# Patient Record
Sex: FEMALE | URBAN - METROPOLITAN AREA
[De-identification: names, ages, dates, MRNs, and addresses within clinical notes are randomized per-mention and may not be internally consistent; named-entity substitution may affect disease eponyms.]

---

## 2024-09-05 ENCOUNTER — TELEPHONE ENCOUNTER (OUTPATIENT)
Dept: URBAN - METROPOLITAN AREA CLINIC 115 | Facility: CLINIC | Age: 44
End: 2024-09-05

## 2024-09-09 ENCOUNTER — OFFICE VISIT (OUTPATIENT)
Dept: URBAN - METROPOLITAN AREA CLINIC 25 | Facility: CLINIC | Age: 44
End: 2024-09-09

## 2024-09-09 RX ORDER — VELPATASVIR AND SOFOSBUVIR 100; 400 MG/1; MG/1
1 TABLET TABLET, FILM COATED ORAL
Qty: 90 | Status: ACTIVE | COMMUNITY
Start: 2024-08-28 | End: 2024-11-25

## 2024-09-09 RX ORDER — VELPATASVIR AND SOFOSBUVIR 100; 400 MG/1; MG/1
1 TABLET TABLET, FILM COATED ORAL
Qty: 90 | OUTPATIENT

## 2024-09-09 NOTE — HPI-TODAY'S VISIT:
September 2024 visit: Labs from July 2024 showed no ova parasites.  Epclusa was approved by pharmacy.  Labs from August 2024 revealed mildly elevated AST 57, ALT 54, otherwise normal LFTs, no anemia, normal platelet count.

## 2024-09-09 NOTE — HPI-OTHER HISTORIES
08/24 OV  No concomitant hep B infection w/ Hep C, Hep C genotype 4, w/ HCV RNA above 4 million UI/mL, liver enzymes slightly elevated w/ AST of 48 and ALT of 44, negative O&P. Patient deneis jaundice, icterus, fatigue, change in bowels, no BRBPR, melena, unintentional weight loss  06/24 OV  Ms. Madrid is a 44y F, she presents to the clinic via referral from Dr. Elias for evaluation of Hep C Ab positive labs, as well as possible parasitic infection prophylaxis, no labs or OV notes on file, the patient recently immigrated to the  from [facility] on January 19th 2024, the patient did not receive prophylactic antiparasitic treatment. Patient reports increased fatigue lately. The patient reports she has not been having any issues w/ her bowels lately, and denies rectal itching, general pruritus, abdominal pain, no BRBPR, melena, jaundice, icterus, etoh use, IVDA, needling or recent peircings

## 2024-09-26 ENCOUNTER — OFFICE VISIT (OUTPATIENT)
Dept: URBAN - METROPOLITAN AREA CLINIC 25 | Facility: CLINIC | Age: 44
End: 2024-09-26
Payer: COMMERCIAL

## 2024-09-26 ENCOUNTER — LAB OUTSIDE AN ENCOUNTER (OUTPATIENT)
Dept: URBAN - METROPOLITAN AREA CLINIC 25 | Facility: CLINIC | Age: 44
End: 2024-09-26

## 2024-09-26 ENCOUNTER — TELEPHONE ENCOUNTER (OUTPATIENT)
Dept: URBAN - METROPOLITAN AREA CLINIC 25 | Facility: CLINIC | Age: 44
End: 2024-09-26

## 2024-09-26 DIAGNOSIS — B18.2: ICD-10-CM

## 2024-09-26 PROCEDURE — 99213 OFFICE O/P EST LOW 20 MIN: CPT

## 2024-09-26 RX ORDER — VELPATASVIR AND SOFOSBUVIR 100; 400 MG/1; MG/1
1 TABLET TABLET, FILM COATED ORAL
Qty: 90 | Status: ON HOLD | COMMUNITY

## 2024-09-26 RX ORDER — VELPATASVIR AND SOFOSBUVIR 100; 400 MG/1; MG/1
1 TABLET TABLET, FILM COATED ORAL ONCE A DAY
Qty: 84 TABLET | Refills: 0 | OUTPATIENT
Start: 2024-09-26 | End: 2024-12-18

## 2024-09-26 NOTE — HPI-OTHER HISTORIES
08/24 OV  No concomitant hep B infection w/ Hep C, Hep C genotype 4, w/ HCV RNA above 4 million UI/mL, liver enzymes slightly elevated w/ AST of 48 and ALT of 44, negative O&P. Patient deneis jaundice, icterus, fatigue, change in bowels, no BRBPR, melena, unintentional weight loss  06/24 OV  Ms. Madrid is a 44y F, she presents to the clinic via referral from Dr. Elias for evaluation of Hep C Ab positive labs, as well as possible parasitic infection prophylaxis, no labs or OV notes on file, the patient recently immigrated to the  from [facility] on January 19th 2024, the patient did not receive prophylactic antiparasitic treatment. Patient reports increased fatigue lately. The patient reports she has not been having any issues w/ her bowels lately, and denies rectal itching, general pruritus, abdominal pain, no BRBPR, melena, jaundice, icterus, etoh use, IVDA, needling or recent peircings

## 2024-09-26 NOTE — HPI-TODAY'S VISIT:
September 2024 visit: Labs from July 2024 showed no ova parasites.  Epclusa was approved by pharmacy.  Labs from August 2024 revealed mildly elevated AST 57, ALT 54, otherwise normal LFTs, no anemia, and normal platelet count. Patient has not started antiviral tx yet, as sis is restricted to fill at [facility] rx which does not accept rx transfers and the patient is requesting we send rx to her pharmacy. No issues since last visit, deneis jaundice, icterus, N/V, no change in bowel habits or unintentional weight loss.

## 2024-09-30 LAB
HCV RNA, QUANTITATIVE: (no result)
HCV RNA, QUANTITATIVE: 6.31

## 2024-11-15 ENCOUNTER — TELEPHONE ENCOUNTER (OUTPATIENT)
Dept: URBAN - METROPOLITAN AREA CLINIC 25 | Facility: CLINIC | Age: 44
End: 2024-11-15

## 2024-11-15 ENCOUNTER — OFFICE VISIT (OUTPATIENT)
Dept: URBAN - METROPOLITAN AREA CLINIC 25 | Facility: CLINIC | Age: 44
End: 2024-11-15
Payer: COMMERCIAL

## 2024-11-15 VITALS
HEIGHT: 62 IN | SYSTOLIC BLOOD PRESSURE: 119 MMHG | HEART RATE: 80 BPM | TEMPERATURE: 97.9 F | WEIGHT: 147 LBS | BODY MASS INDEX: 27.05 KG/M2 | DIASTOLIC BLOOD PRESSURE: 80 MMHG

## 2024-11-15 DIAGNOSIS — B18.2: ICD-10-CM

## 2024-11-15 PROCEDURE — 99213 OFFICE O/P EST LOW 20 MIN: CPT

## 2024-11-15 RX ORDER — VELPATASVIR AND SOFOSBUVIR 100; 400 MG/1; MG/1
1 TABLET TABLET, FILM COATED ORAL ONCE A DAY
Qty: 84 TABLET | Refills: 0 | Status: ON HOLD | COMMUNITY
Start: 2024-09-26 | End: 2024-12-18

## 2024-11-15 RX ORDER — VELPATASVIR AND SOFOSBUVIR 100; 400 MG/1; MG/1
1 TABLET TABLET, FILM COATED ORAL
Qty: 90 | Status: ON HOLD | COMMUNITY

## 2024-11-15 NOTE — HPI-OTHER HISTORIES
September 2024 visit: Labs from July 2024 showed no ova parasites. Epclusa was approved by pharmacy. Labs from August 2024 revealed mildly elevated AST 57, ALT 54, otherwise normal LFTs, no anemia, and normal platelet count. Patient has not started antiviral tx yet, as sis is restricted to fill at [facility] rx which does not accept rx transfers and the patient is requesting we send rx to her pharmacy. No issues since last visit, deneis jaundice, icterus, N/V, no change in bowel habits or unintentional weight loss.   08/24 OV  No concomitant hep B infection w/ Hep C, Hep C genotype 4, w/ HCV RNA above 4 million UI/mL, liver enzymes slightly elevated w/ AST of 48 and ALT of 44, negative O&P. Patient deneis jaundice, icterus, fatigue, change in bowels, no BRBPR, melena, unintentional weight loss  06/24 OV  Ms. Madrid is a 44y F, she presents to the clinic via referral from Dr. Elias for evaluation of Hep C Ab positive labs, as well as possible parasitic infection prophylaxis, no labs or OV notes on file, the patient recently immigrated to the  from [facility] on January 19th 2024, the patient did not receive prophylactic antiparasitic treatment. Patient reports increased fatigue lately. The patient reports she has not been having any issues w/ her bowels lately, and denies rectal itching, general pruritus, abdominal pain, no BRBPR, melena, jaundice, icterus, etoh use, IVDA, needling or recent peircings

## 2024-11-15 NOTE — HPI-TODAY'S VISIT:
11/24 OV HCV RNA levels remained elevated from 09/24, but decreased from August labs from 4 million to 2 million UI/ml, CBC/CMP labs are incomplete. The patient has not started epclusa rx, rx was sent twice to [facility], patient has a letter with her today indicating that [facility] has been trying to send the Rx to them but unable to reach the patient, patient reports she finally spoke to them and was supposed to receive the rx yesterday.

## 2024-12-04 ENCOUNTER — OFFICE VISIT (OUTPATIENT)
Dept: URBAN - METROPOLITAN AREA CLINIC 25 | Facility: CLINIC | Age: 44
End: 2024-12-04

## 2025-01-03 ENCOUNTER — OFFICE VISIT (OUTPATIENT)
Dept: URBAN - METROPOLITAN AREA CLINIC 25 | Facility: CLINIC | Age: 45
End: 2025-01-03
Payer: COMMERCIAL

## 2025-01-03 ENCOUNTER — TELEPHONE ENCOUNTER (OUTPATIENT)
Dept: URBAN - METROPOLITAN AREA CLINIC 25 | Facility: CLINIC | Age: 45
End: 2025-01-03

## 2025-01-03 VITALS
HEART RATE: 64 BPM | DIASTOLIC BLOOD PRESSURE: 83 MMHG | TEMPERATURE: 97.9 F | HEIGHT: 62 IN | SYSTOLIC BLOOD PRESSURE: 117 MMHG | BODY MASS INDEX: 27.12 KG/M2 | WEIGHT: 147.4 LBS

## 2025-01-03 DIAGNOSIS — B18.2: ICD-10-CM

## 2025-01-03 PROCEDURE — 99214 OFFICE O/P EST MOD 30 MIN: CPT

## 2025-01-03 RX ORDER — VELPATASVIR AND SOFOSBUVIR 100; 400 MG/1; MG/1
1 TABLET TABLET, FILM COATED ORAL
Qty: 90 | Status: ON HOLD | COMMUNITY

## 2025-01-03 NOTE — HPI-TODAY'S VISIT:
01/25 OV The patient had finally received her rx for epclusa, however, she was only sent one 30 day supply on 11/13 and she had completed a one-month supply on 12/14/24, [facility] did not send the patient the full prescription and advised the patient to call for a refill. Patient denies any jaundice, icterus, malaise, BRBPR, melena, unintentional weigth loss

## 2025-01-03 NOTE — HPI-OTHER HISTORIES
11/24 OV HCV RNA levels remained elevated from 09/24, but decreased from August labs from 4 million to 2 million UI/ml, CBC/CMP labs are incomplete. The patient has not started epclusa rx, rx was sent twice to [facility], patient has a letter with her today indicating that [facility] has been trying to send the Rx to them but unable to reach the patient, patient reports she finally spoke to them and was supposed to receive the rx yesterday.  September 2024 visit: Labs from July 2024 showed no ova parasites. Epclusa was approved by pharmacy. Labs from August 2024 revealed mildly elevated AST 57, ALT 54, otherwise normal LFTs, no anemia, and normal platelet count. Patient has not started antiviral tx yet, as sis is restricted to fill at [facility] which does not accept rx transfers and the patient is requesting we send rx to her pharmacy. No issues since last visit, deneis jaundice, icterus, N/V, no change in bowel habits or unintentional weight loss.   08/24 OV  No concomitant hep B infection w/ Hep C, Hep C genotype 4, w/ HCV RNA above 4 million UI/mL, liver enzymes slightly elevated w/ AST of 48 and ALT of 44, negative O&P. Patient deneis jaundice, icterus, fatigue, change in bowels, no BRBPR, melena, unintentional weight loss  06/24 OV  Ms. Madrid is a 44y F, she presents to the clinic via referral from Dr. Elias for evaluation of Hep C Ab positive labs, as well as possible parasitic infection prophylaxis, no labs or OV notes on file, the patient recently immigrated to the  from [facility] on January 19th 2024, the patient did not receive prophylactic antiparasitic treatment. Patient reports increased fatigue lately. The patient reports she has not been having any issues w/ her bowels lately, and denies rectal itching, general pruritus, abdominal pain, no BRBPR, melena, jaundice, icterus, etoh use, IVDA, needling or recent peircings

## 2025-01-08 ENCOUNTER — TELEPHONE ENCOUNTER (OUTPATIENT)
Dept: URBAN - METROPOLITAN AREA CLINIC 25 | Facility: CLINIC | Age: 45
End: 2025-01-08

## 2025-01-09 ENCOUNTER — OFFICE VISIT (OUTPATIENT)
Dept: URBAN - METROPOLITAN AREA CLINIC 25 | Facility: CLINIC | Age: 45
End: 2025-01-09

## 2025-01-17 ENCOUNTER — TELEPHONE ENCOUNTER (OUTPATIENT)
Dept: URBAN - METROPOLITAN AREA CLINIC 25 | Facility: CLINIC | Age: 45
End: 2025-01-17

## 2025-02-14 ENCOUNTER — OFFICE VISIT (OUTPATIENT)
Dept: URBAN - METROPOLITAN AREA CLINIC 25 | Facility: CLINIC | Age: 45
End: 2025-02-14
Payer: MEDICAID

## 2025-02-14 VITALS
HEIGHT: 62 IN | BODY MASS INDEX: 26.87 KG/M2 | HEART RATE: 80 BPM | WEIGHT: 146 LBS | SYSTOLIC BLOOD PRESSURE: 124 MMHG | DIASTOLIC BLOOD PRESSURE: 79 MMHG | TEMPERATURE: 98.1 F

## 2025-02-14 DIAGNOSIS — B18.2: ICD-10-CM

## 2025-02-14 DIAGNOSIS — R76.8 HEPATITIS C ANTIBODY TEST POSITIVE: ICD-10-CM

## 2025-02-14 PROCEDURE — 99213 OFFICE O/P EST LOW 20 MIN: CPT

## 2025-02-14 RX ORDER — VELPATASVIR AND SOFOSBUVIR 100; 400 MG/1; MG/1
1 TABLET TABLET, FILM COATED ORAL
Qty: 90 | Status: DISCONTINUED | COMMUNITY

## 2025-02-14 NOTE — HPI-TODAY'S VISIT:
02/25 OV  Epclusa rx approved again for 12 week's supply, however, repeat HCV RNA labs from 01/25 were undetectable, indicating possible response, repeat HCV RNA labs were incomplete, patient denies any GI symptoms, no BRBPR, melena, unintentional wt loss, abdominal pain, N/V, jaundice/icterus, fatigue

## 2025-02-14 NOTE — HPI-OTHER HISTORIES
01/25 OV The patient had finally received her rx for epclusa, however, she was only sent one 30 day supply on 11/13 and she had completed a one-month supply on 12/14/24, [facility] did not send the patient the full prescription and advised the patient to call for a refill. Patient denies any jaundice, icterus, malaise, BRBPR, melena, unintentional weigth loss  11/24 OV HCV RNA levels remained elevated from 09/24, but decreased from August labs from 4 million to 2 million UI/ml, CBC/CMP labs are incomplete. The patient has not started epclusa rx, rx was sent twice to [facility], patient has a letter with her today indicating that [facility] has been trying to send the Rx to them but unable to reach the patient, patient reports she finally spoke to them and was supposed to receive the rx yesterday.  September 2024 visit: Labs from July 2024 showed no ova parasites. Epclusa was approved by pharmacy. Labs from August 2024 revealed mildly elevated AST 57, ALT 54, otherwise normal LFTs, no anemia, and normal platelet count. Patient has not started antiviral tx yet, as sis is restricted to fill at [facility] rx which does not accept rx transfers and the patient is requesting we send rx to her pharmacy. No issues since last visit, deneis jaundice, icterus, N/V, no change in bowel habits or unintentional weight loss.   08/24 OV  No concomitant hep B infection w/ Hep C, Hep C genotype 4, w/ HCV RNA above 4 million UI/mL, liver enzymes slightly elevated w/ AST of 48 and ALT of 44, negative O&P. Patient deneis jaundice, icterus, fatigue, change in bowels, no BRBPR, melena, unintentional weight loss  06/24 OV  Ms. Madrid is a 44y F, she presents to the clinic via referral from Dr. Elias for evaluation of Hep C Ab positive labs, as well as possible parasitic infection prophylaxis, no labs or OV notes on file, the patient recently immigrated to the  from [facility] on January 19th 2024, the patient did not receive prophylactic antiparasitic treatment. Patient reports increased fatigue lately. The patient reports she has not been having any issues w/ her bowels lately, and denies rectal itching, general pruritus, abdominal pain, no BRBPR, melena, jaundice, icterus, etoh use, IVDA, needling or recent peircings

## 2025-04-21 ENCOUNTER — LAB OUTSIDE AN ENCOUNTER (OUTPATIENT)
Dept: URBAN - METROPOLITAN AREA CLINIC 25 | Facility: CLINIC | Age: 45
End: 2025-04-21

## 2025-04-24 LAB
HCV RNA, QUANTITATIVE: (no result)
HCV RNA, QUANTITATIVE: (no result)

## 2025-05-08 ENCOUNTER — OFFICE VISIT (OUTPATIENT)
Dept: URBAN - METROPOLITAN AREA CLINIC 25 | Facility: CLINIC | Age: 45
End: 2025-05-08
Payer: MEDICAID

## 2025-05-08 DIAGNOSIS — Z86.19 HISTORY OF HEPATITIS C: ICD-10-CM

## 2025-05-08 DIAGNOSIS — Z12.11 COLON CANCER SCREENING: ICD-10-CM

## 2025-05-08 PROCEDURE — 99214 OFFICE O/P EST MOD 30 MIN: CPT

## 2025-05-08 NOTE — HPI-OTHER HISTORIES
02/25 OV  Epclusa rx approved again for 12 week's supply, however, repeat HCV RNA labs from 01/25 were undetectable, indicating possible response, repeat HCV RNA labs were incomplete, patient denies any GI symptoms, no BRBPR, melena, unintentional wt loss, abdominal pain, N/V, jaundice/icterus, fatigue  01/25 OV The patient had finally received her rx for epclusa, however, she was only sent one 30 day supply on 11/13 and she had completed a one-month supply on 12/14/24, [facility] did not send the patient the full prescription and advised the patient to call for a refill. Patient denies any jaundice, icterus, malaise, BRBPR, melena, unintentional weigth loss  11/24 OV HCV RNA levels remained elevated from 09/24, but decreased from August labs from 4 million to 2 million UI/ml, CBC/CMP labs are incomplete. The patient has not started epclusa rx, rx was sent twice to [facility], patient has a letter with her today indicating that [facility] has been trying to send the Rx to them but unable to reach the patient, patient reports she finally spoke to them and was supposed to receive the rx yesterday.  September 2024 visit: Labs from July 2024 showed no ova parasites. Epclusa was approved by pharmacy. Labs from August 2024 revealed mildly elevated AST 57, ALT 54, otherwise normal LFTs, no anemia, and normal platelet count. Patient has not started antiviral tx yet, as sis is restricted to fill at [facility] rx which does not accept rx transfers and the patient is requesting we send rx to her pharmacy. No issues since last visit, deneis jaundice, icterus, N/V, no change in bowel habits or unintentional weight loss.   08/24 OV  No concomitant hep B infection w/ Hep C, Hep C genotype 4, w/ HCV RNA above 4 million UI/mL, liver enzymes slightly elevated w/ AST of 48 and ALT of 44, negative O&P. Patient deneis jaundice, icterus, fatigue, change in bowels, no BRBPR, melena, unintentional weight loss  06/24 OV  Ms. Madrid is a 44y F, she presents to the clinic via referral from Dr. Elias for evaluation of Hep C Ab positive labs, as well as possible parasitic infection prophylaxis, no labs or OV notes on file, the patient recently immigrated to the  from [facility] on January 19th 2024, the patient did not receive prophylactic antiparasitic treatment. Patient reports increased fatigue lately. The patient reports she has not been having any issues w/ her bowels lately, and denies rectal itching, general pruritus, abdominal pain, no BRBPR, melena, jaundice, icterus, etoh use, IVDA, needling or recent peircings

## 2025-05-08 NOTE — HPI-TODAY'S VISIT:
05/25 OV  HCV RNA remains undetectable, the patient completed 4-week course of eplcusa earlier this year, she did not complete the full 12-week course of the rx. No issues as of late, she denies any jaundice, icterus, no N/V, no abdominal pain or bowel changes. Last colonoscopy: N/A  No known family h/o colon cancer/polyps. Denies cardiac hardware, dialysis, blood thinner use, and or issues with anesthesia

## 2025-05-16 ENCOUNTER — OFFICE VISIT (OUTPATIENT)
Dept: URBAN - METROPOLITAN AREA CLINIC 25 | Facility: CLINIC | Age: 45
End: 2025-05-16

## 2025-06-17 ENCOUNTER — CLAIMS CREATED FROM THE CLAIM WINDOW (OUTPATIENT)
Dept: URBAN - METROPOLITAN AREA SURGERY CENTER 20 | Facility: SURGERY CENTER | Age: 45
End: 2025-06-17
Payer: MEDICAID

## 2025-06-17 DIAGNOSIS — Z12.11 COLON CANCER SCREENING: ICD-10-CM

## 2025-06-17 DIAGNOSIS — K63.89 OTHER SPECIFIED DISEASES OF INTESTINE: ICD-10-CM

## 2025-06-17 DIAGNOSIS — D12.3 ADENOMA OF TRANSVERSE COLON: ICD-10-CM

## 2025-06-17 DIAGNOSIS — Z12.11 ENCOUNTER SCREENING FOR MALIGNANT NEOPLASM OF COLON: ICD-10-CM

## 2025-06-17 PROCEDURE — 45385 COLONOSCOPY W/LESION REMOVAL: CPT | Performed by: INTERNAL MEDICINE

## 2025-06-17 PROCEDURE — 45380 COLONOSCOPY AND BIOPSY: CPT | Performed by: INTERNAL MEDICINE

## 2025-06-17 PROCEDURE — 00811 ANES LWR INTST NDSC NOS: CPT | Performed by: NURSE ANESTHETIST, CERTIFIED REGISTERED
